# Patient Record
Sex: MALE | Race: WHITE | NOT HISPANIC OR LATINO | Employment: FULL TIME | URBAN - METROPOLITAN AREA
[De-identification: names, ages, dates, MRNs, and addresses within clinical notes are randomized per-mention and may not be internally consistent; named-entity substitution may affect disease eponyms.]

---

## 2018-01-23 ENCOUNTER — APPOINTMENT (OUTPATIENT)
Dept: GENERAL RADIOLOGY | Facility: HOSPITAL | Age: 43
End: 2018-01-23

## 2018-01-23 ENCOUNTER — HOSPITAL ENCOUNTER (EMERGENCY)
Facility: HOSPITAL | Age: 43
Discharge: HOME | End: 2018-01-24
Attending: EMERGENCY MEDICINE | Admitting: EMERGENCY MEDICINE

## 2018-01-23 DIAGNOSIS — E86.0 DEHYDRATION: ICD-10-CM

## 2018-01-23 DIAGNOSIS — B34.9 VIRAL SYNDROME: Primary | ICD-10-CM

## 2018-01-23 LAB
ALBUMIN SERPL-MCNC: 4.6 G/DL (ref 3.2–4.8)
ALBUMIN/GLOB SERPL: 1.8 G/DL (ref 1.5–2.5)
ALP SERPL-CCNC: 69 U/L (ref 25–100)
ALT SERPL W P-5'-P-CCNC: 91 U/L (ref 7–40)
ANION GAP SERPL CALCULATED.3IONS-SCNC: 7 MMOL/L (ref 3–11)
AST SERPL-CCNC: 54 U/L (ref 0–33)
BASOPHILS # BLD AUTO: 0.02 10*3/MM3 (ref 0–0.2)
BASOPHILS NFR BLD AUTO: 0.2 % (ref 0–1)
BILIRUB SERPL-MCNC: 0.4 MG/DL (ref 0.3–1.2)
BNP SERPL-MCNC: <2 PG/ML (ref 0–100)
BUN BLD-MCNC: 14 MG/DL (ref 9–23)
BUN/CREAT SERPL: 14 (ref 7–25)
CALCIUM SPEC-SCNC: 9.6 MG/DL (ref 8.7–10.4)
CHLORIDE SERPL-SCNC: 107 MMOL/L (ref 99–109)
CO2 SERPL-SCNC: 25 MMOL/L (ref 20–31)
CREAT BLD-MCNC: 1 MG/DL (ref 0.6–1.3)
DEPRECATED RDW RBC AUTO: 42.5 FL (ref 37–54)
EOSINOPHIL # BLD AUTO: 0.1 10*3/MM3 (ref 0–0.3)
EOSINOPHIL NFR BLD AUTO: 1.2 % (ref 0–3)
ERYTHROCYTE [DISTWIDTH] IN BLOOD BY AUTOMATED COUNT: 12.8 % (ref 11.3–14.5)
FLUAV AG NPH QL: NEGATIVE
FLUBV AG NPH QL IA: NEGATIVE
GFR SERPL CREATININE-BSD FRML MDRD: 82 ML/MIN/1.73
GLOBULIN UR ELPH-MCNC: 2.6 GM/DL
GLUCOSE BLD-MCNC: 101 MG/DL (ref 70–100)
HCT VFR BLD AUTO: 48.5 % (ref 38.9–50.9)
HGB BLD-MCNC: 16.8 G/DL (ref 13.1–17.5)
HOLD SPECIMEN: NORMAL
HOLD SPECIMEN: NORMAL
IMM GRANULOCYTES # BLD: 0.04 10*3/MM3 (ref 0–0.03)
IMM GRANULOCYTES NFR BLD: 0.5 % (ref 0–0.6)
LYMPHOCYTES # BLD AUTO: 0.68 10*3/MM3 (ref 0.6–4.8)
LYMPHOCYTES NFR BLD AUTO: 8.4 % (ref 24–44)
MCH RBC QN AUTO: 31.5 PG (ref 27–31)
MCHC RBC AUTO-ENTMCNC: 34.6 G/DL (ref 32–36)
MCV RBC AUTO: 90.8 FL (ref 80–99)
MONOCYTES # BLD AUTO: 1.35 10*3/MM3 (ref 0–1)
MONOCYTES NFR BLD AUTO: 16.8 % (ref 0–12)
NEUTROPHILS # BLD AUTO: 5.86 10*3/MM3 (ref 1.5–8.3)
NEUTROPHILS NFR BLD AUTO: 72.9 % (ref 41–71)
PLATELET # BLD AUTO: 184 10*3/MM3 (ref 150–450)
PMV BLD AUTO: 9.7 FL (ref 6–12)
POTASSIUM BLD-SCNC: 4 MMOL/L (ref 3.5–5.5)
PROT SERPL-MCNC: 7.2 G/DL (ref 5.7–8.2)
RBC # BLD AUTO: 5.34 10*6/MM3 (ref 4.2–5.76)
SODIUM BLD-SCNC: 139 MMOL/L (ref 132–146)
TROPONIN I SERPL-MCNC: 0.01 NG/ML (ref 0–0.07)
WBC NRBC COR # BLD: 8.05 10*3/MM3 (ref 3.5–10.8)
WHOLE BLOOD HOLD SPECIMEN: NORMAL
WHOLE BLOOD HOLD SPECIMEN: NORMAL

## 2018-01-23 PROCEDURE — 84484 ASSAY OF TROPONIN QUANT: CPT

## 2018-01-23 PROCEDURE — 87804 INFLUENZA ASSAY W/OPTIC: CPT | Performed by: EMERGENCY MEDICINE

## 2018-01-23 PROCEDURE — 96360 HYDRATION IV INFUSION INIT: CPT

## 2018-01-23 PROCEDURE — 83880 ASSAY OF NATRIURETIC PEPTIDE: CPT | Performed by: EMERGENCY MEDICINE

## 2018-01-23 PROCEDURE — 80053 COMPREHEN METABOLIC PANEL: CPT | Performed by: EMERGENCY MEDICINE

## 2018-01-23 PROCEDURE — 93005 ELECTROCARDIOGRAM TRACING: CPT | Performed by: EMERGENCY MEDICINE

## 2018-01-23 PROCEDURE — 71045 X-RAY EXAM CHEST 1 VIEW: CPT

## 2018-01-23 PROCEDURE — 99284 EMERGENCY DEPT VISIT MOD MDM: CPT

## 2018-01-23 PROCEDURE — 85025 COMPLETE CBC W/AUTO DIFF WBC: CPT | Performed by: EMERGENCY MEDICINE

## 2018-01-23 RX ORDER — SODIUM CHLORIDE 0.9 % (FLUSH) 0.9 %
10 SYRINGE (ML) INJECTION AS NEEDED
Status: DISCONTINUED | OUTPATIENT
Start: 2018-01-23 | End: 2018-01-24 | Stop reason: HOSPADM

## 2018-01-23 RX ORDER — IBUPROFEN 600 MG/1
600 TABLET ORAL ONCE
Status: COMPLETED | OUTPATIENT
Start: 2018-01-23 | End: 2018-01-23

## 2018-01-23 RX ADMIN — SODIUM CHLORIDE 1000 ML: 9 INJECTION, SOLUTION INTRAVENOUS at 21:43

## 2018-01-23 RX ADMIN — IBUPROFEN 600 MG: 600 TABLET, FILM COATED ORAL at 21:43

## 2018-01-24 VITALS
WEIGHT: 255 LBS | DIASTOLIC BLOOD PRESSURE: 103 MMHG | OXYGEN SATURATION: 77 % | BODY MASS INDEX: 33.8 KG/M2 | HEART RATE: 128 BPM | RESPIRATION RATE: 18 BRPM | SYSTOLIC BLOOD PRESSURE: 161 MMHG | TEMPERATURE: 98.9 F | HEIGHT: 73 IN

## 2018-01-24 NOTE — ED PROVIDER NOTES
Subjective   HPI Comments: Mr. Petr Samayoa is a 42 year old male who presents to the ED with c/o flu-like sx. The patient reports that he woke up this morning feeling poorly, with associated shortness of air, cough, fatigue, fever, generalized myalgias, rhinorrhea, and ear pain. The patient states that his sx have been constant since onset, and have worsened considerably over the course of the day. The patient tried resting, but denies any relief. The patient was evaluated at an [facility] PTA, but was directed to the ED for his fast heart rate and negative flu swab. The patient denies any associated diarrhea, nausea, sore throat, vomiting, or congestion. The patient denies a hx of diabetes mellitus, immunocompromise state, or kidney disease. The patient denies any other acute sx at this time.    Patient is a 42 y.o. male presenting with flu symptoms.   History provided by:  Patient  Flu Symptoms   Presenting symptoms: cough, fatigue, fever, myalgias, rhinorrhea and shortness of breath    Presenting symptoms: no diarrhea, no nausea, no sore throat and no vomiting    Severity:  Moderate  Onset quality:  Sudden  Duration:  1 day  Progression:  Worsening  Chronicity:  New  Relieved by:  Nothing  Worsened by:  Nothing  Ineffective treatments:  Rest  Associated symptoms: ear pain    Associated symptoms: no congestion    Risk factors: not elderly, no diabetes problem, no immunocompromised state, no kidney disease and not pregnant        Review of Systems   Constitutional: Positive for fatigue and fever.   HENT: Positive for ear pain and rhinorrhea. Negative for congestion and sore throat.    Respiratory: Positive for cough and shortness of breath.    Gastrointestinal: Negative for diarrhea, nausea and vomiting.   Genitourinary: Negative for decreased urine volume, difficulty urinating, dysuria, frequency, hematuria and urgency.   Musculoskeletal: Positive for myalgias.   Allergic/Immunologic: Negative for immunocompromised  state.   All other systems reviewed and are negative.      Past Medical History:   Diagnosis Date   • Hypertension        No Known Allergies    History reviewed. No pertinent surgical history.    Family History   Problem Relation Age of Onset   • Diabetes Mother    • Coronary artery disease Mother    • Coronary artery disease Father        Social History     Social History   • Marital status:      Spouse name: N/A   • Number of children: N/A   • Years of education: N/A     Occupational History   • engineering      Social History Main Topics   • Smoking status: Never Smoker   • Smokeless tobacco: Never Used   • Alcohol use Yes      Comment: occasionally   • Drug use: No   • Sexual activity: Not Asked     Other Topics Concern   • None     Social History Narrative         Objective   Physical Exam   Constitutional: He is oriented to person, place, and time. He appears well-developed and well-nourished. No distress.   The patient is mildly diaphoretic.   HENT:   Head: Normocephalic and atraumatic.   Right Ear: Tympanic membrane normal.   Left Ear: Tympanic membrane normal.   Mouth/Throat: Oropharynx is clear and moist.   Ears are normal. Throat benign. Airway patent.   Eyes: Conjunctivae are normal. No scleral icterus.   Neck: Normal range of motion. Neck supple.   Ranges neck well, no signs of meningismus.   Cardiovascular: Regular rhythm and normal heart sounds.  Tachycardia present.  Exam reveals no gallop and no friction rub.    No murmur heard.  Pulmonary/Chest: Effort normal and breath sounds normal. No respiratory distress. He has no wheezes. He has no rales.   Abdominal: Soft. Bowel sounds are normal. There is no tenderness. There is no guarding.   Musculoskeletal: Normal range of motion.   Lymphadenopathy:     He has no cervical adenopathy.   No anterior cervical lymphadenopathy.   Neurological: He is alert and oriented to person, place, and time.   Skin: Skin is warm. He is diaphoretic.   Psychiatric:  He has a normal mood and affect. His behavior is normal.   Nursing note and vitals reviewed.      Procedures         ED Course  ED Course       Recent Results (from the past 24 hour(s))   POCT Influenza A/B    Collection Time: 01/23/18  8:41 PM   Result Value Ref Range    Rapid Influenza A Ag negative     Rapid Influenza B Ag negative     Internal Control Passed Passed    Lot Number [id number]     Expiration Date 12-6-2020    Comprehensive Metabolic Panel    Collection Time: 01/23/18  9:33 PM   Result Value Ref Range    Glucose 101 (H) 70 - 100 mg/dL    BUN 14 9 - 23 mg/dL    Creatinine 1.00 0.60 - 1.30 mg/dL    Sodium 139 132 - 146 mmol/L    Potassium 4.0 3.5 - 5.5 mmol/L    Chloride 107 99 - 109 mmol/L    CO2 25.0 20.0 - 31.0 mmol/L    Calcium 9.6 8.7 - 10.4 mg/dL    Total Protein 7.2 5.7 - 8.2 g/dL    Albumin 4.60 3.20 - 4.80 g/dL    ALT (SGPT) 91 (H) 7 - 40 U/L    AST (SGOT) 54 (H) 0 - 33 U/L    Alkaline Phosphatase 69 25 - 100 U/L    Total Bilirubin 0.4 0.3 - 1.2 mg/dL    eGFR Non African Amer 82 >60 mL/min/1.73    Globulin 2.6 gm/dL    A/G Ratio 1.8 1.5 - 2.5 g/dL    BUN/Creatinine Ratio 14.0 7.0 - 25.0    Anion Gap 7.0 3.0 - 11.0 mmol/L   BNP    Collection Time: 01/23/18  9:33 PM   Result Value Ref Range    BNP <2.0 0.0 - 100.0 pg/mL   CBC Auto Differential    Collection Time: 01/23/18  9:33 PM   Result Value Ref Range    WBC 8.05 3.50 - 10.80 10*3/mm3    RBC 5.34 4.20 - 5.76 10*6/mm3    Hemoglobin 16.8 13.1 - 17.5 g/dL    Hematocrit 48.5 38.9 - 50.9 %    MCV 90.8 80.0 - 99.0 fL    MCH 31.5 (H) 27.0 - 31.0 pg    MCHC 34.6 32.0 - 36.0 g/dL    RDW 12.8 11.3 - 14.5 %    RDW-SD 42.5 37.0 - 54.0 fl    MPV 9.7 6.0 - 12.0 fL    Platelets 184 150 - 450 10*3/mm3    Neutrophil % 72.9 (H) 41.0 - 71.0 %    Lymphocyte % 8.4 (L) 24.0 - 44.0 %    Monocyte % 16.8 (H) 0.0 - 12.0 %    Eosinophil % 1.2 0.0 - 3.0 %    Basophil % 0.2 0.0 - 1.0 %    Immature Grans % 0.5 0.0 - 0.6 %    Neutrophils, Absolute 5.86 1.50 - 8.30 10*3/mm3  "   Lymphocytes, Absolute 0.68 0.60 - 4.80 10*3/mm3    Monocytes, Absolute 1.35 (H) 0.00 - 1.00 10*3/mm3    Eosinophils, Absolute 0.10 0.00 - 0.30 10*3/mm3    Basophils, Absolute 0.02 0.00 - 0.20 10*3/mm3    Immature Grans, Absolute 0.04 (H) 0.00 - 0.03 10*3/mm3   POC Troponin, Rapid    Collection Time: 01/23/18  9:38 PM   Result Value Ref Range    Troponin I 0.01 0.00 - 0.07 ng/mL   Influenza Antigen, Rapid - Swab, Nasopharynx    Collection Time: 01/23/18  9:44 PM   Result Value Ref Range    Influenza A Ag, EIA Negative Negative    Influenza B Ag, EIA Negative Negative     Note: In addition to lab results from this visit, the labs listed above may include labs taken at another facility or during a different encounter within the last 24 hours. Please correlate lab times with ED admission and discharge times for further clarification of the services performed during this visit.    XR Chest 1 View   Final Result     No acute findings.      THIS DOCUMENT HAS BEEN ELECTRONICALLY SIGNED BY TATE VELARDE MD        Vitals:    01/23/18 2102   BP: 167/100   Pulse: (!) 128   Resp: 18   Temp: 100 °F (37.8 °C)   TempSrc: Oral   SpO2: 97%   Weight: 116 kg (255 lb)   Height: 185.4 cm (73\")     Medications   sodium chloride 0.9 % flush 10 mL (not administered)   sodium chloride 0.9 % bolus 1,000 mL (1,000 mL Intravenous New Bag 1/23/18 2143)   ibuprofen (ADVIL,MOTRIN) tablet 600 mg (600 mg Oral Given 1/23/18 2143)     ECG/EMG Results (last 24 hours)     Procedure Component Value Units Date/Time    ECG 12 Lead [[id number]] Collected:  01/23/18 2108     Updated:  01/23/18 2111    Narrative:       Test Reason : SOA Protocol  Blood Pressure : **/** mmHG  Vent. Rate : 133 BPM     Atrial Rate : 133 BPM     P-R Int : 150 ms          QRS Dur : 092 ms      QT Int : 290 ms       P-R-T Axes : 032 061 013 degrees     QTc Int : 431 ms    Sinus tachycardia  Septal infarct , age undetermined  Abnormal ECG  No previous ECGs available  Confirmed by " ROSA ELENA CANO ([id number]) on 1/23/2018 9:11:22 PM    Referred By:  EDMD           Confirmed By:ROSA ELENA CANO                      MDM  Number of Diagnoses or Management Options  Dehydration: new and requires workup  Viral syndrome: new and requires workup  Diagnosis management comments: Influenza screen negative, chest x-ray does not show any acute infection, no significant laboratory abnormalities.    Patient was given 1 L IV fluids, and the previously administered antipyretics have helped control the patient's fever and heart rate.    Patient will be discharged with the advise to alternate Tylenol and ibuprofen for fever control, continue to drink plenty of fluids and get rest.    Follow-up with primary care physician for repeat evaluation in 3 days.       Amount and/or Complexity of Data Reviewed  Clinical lab tests: ordered and reviewed  Tests in the radiology section of CPT®: reviewed and ordered  Review and summarize past medical records: yes  Independent visualization of images, tracings, or specimens: yes    Patient Progress  Patient progress: stable      Final diagnoses:   Viral syndrome   Dehydration       Documentation assistance provided by memo Rasmussen.  Information recorded by the scribe was done at my direction and has been verified and validated by me.     Yair Rasmussen  01/23/18 4327       Rosa Elena Cano MD  01/23/18 9362

## 2018-01-24 NOTE — DISCHARGE INSTRUCTIONS
Patient will be advised to alternate ibuprofen 600 mg, and Tylenol 650 mg, every 3 hours to help control fever.    Patient will be advised to drink plenty of fluids and get plenty of rest    Follow-up with primary care physician for repeat evaluation in 3 days.

## 2021-08-17 PROCEDURE — U0004 COV-19 TEST NON-CDC HGH THRU: HCPCS | Performed by: NURSE PRACTITIONER

## 2021-08-17 PROCEDURE — 87081 CULTURE SCREEN ONLY: CPT | Performed by: NURSE PRACTITIONER

## 2021-08-19 ENCOUNTER — TELEPHONE (OUTPATIENT)
Dept: URGENT CARE | Facility: CLINIC | Age: 46
End: 2021-08-19

## 2021-08-20 ENCOUNTER — TELEPHONE (OUTPATIENT)
Dept: URGENT CARE | Facility: CLINIC | Age: 46
End: 2021-08-20

## 2021-08-20 NOTE — TELEPHONE ENCOUNTER
----- Message from Jeremías Cramer MD sent at 8/19/2021 10:45 AM EDT -----  Strep culture is negative

## 2022-04-11 DIAGNOSIS — Z12.11 SCREENING FOR COLON CANCER: Primary | ICD-10-CM

## 2022-05-03 ENCOUNTER — OUTSIDE FACILITY SERVICE (OUTPATIENT)
Dept: GASTROENTEROLOGY | Facility: CLINIC | Age: 47
End: 2022-05-03

## 2022-05-03 PROCEDURE — 45385 COLONOSCOPY W/LESION REMOVAL: CPT | Performed by: INTERNAL MEDICINE

## 2022-05-03 PROCEDURE — 88305 TISSUE EXAM BY PATHOLOGIST: CPT | Performed by: INTERNAL MEDICINE

## 2022-05-04 ENCOUNTER — LAB REQUISITION (OUTPATIENT)
Dept: LAB | Facility: HOSPITAL | Age: 47
End: 2022-05-04

## 2022-05-04 DIAGNOSIS — Z12.11 ENCOUNTER FOR SCREENING FOR MALIGNANT NEOPLASM OF COLON: ICD-10-CM

## 2022-05-05 ENCOUNTER — TELEPHONE (OUTPATIENT)
Dept: GASTROENTEROLOGY | Facility: CLINIC | Age: 47
End: 2022-05-05

## 2022-05-05 LAB
CYTO UR: NORMAL
LAB AP CASE REPORT: NORMAL
LAB AP CLINICAL INFORMATION: NORMAL
PATH REPORT.FINAL DX SPEC: NORMAL
PATH REPORT.GROSS SPEC: NORMAL

## 2022-05-05 NOTE — TELEPHONE ENCOUNTER
----- Message from Danish Glasgow MD sent at 5/5/2022  9:42 AM EDT -----  Please let Petr know he had multiple adenomas.  Follow-up in 3 years time is recommended

## 2024-01-01 ENCOUNTER — APPOINTMENT (OUTPATIENT)
Dept: GENERAL RADIOLOGY | Facility: HOSPITAL | Age: 49
End: 2024-01-01
Payer: COMMERCIAL

## 2024-01-01 ENCOUNTER — HOSPITAL ENCOUNTER (EMERGENCY)
Facility: HOSPITAL | Age: 49
Discharge: HOME | End: 2024-01-01
Attending: EMERGENCY MEDICINE | Admitting: EMERGENCY MEDICINE
Payer: COMMERCIAL

## 2024-01-01 VITALS
HEIGHT: 73 IN | SYSTOLIC BLOOD PRESSURE: 168 MMHG | RESPIRATION RATE: 18 BRPM | BODY MASS INDEX: 31.14 KG/M2 | TEMPERATURE: 97.9 F | OXYGEN SATURATION: 98 % | WEIGHT: 235 LBS | HEART RATE: 99 BPM | DIASTOLIC BLOOD PRESSURE: 94 MMHG

## 2024-01-01 DIAGNOSIS — M54.9 UPPER BACK PAIN: Primary | ICD-10-CM

## 2024-01-01 PROCEDURE — 99282 EMERGENCY DEPT VISIT SF MDM: CPT

## 2024-01-01 PROCEDURE — 25010000002 KETOROLAC TROMETHAMINE PER 15 MG: Performed by: PHYSICIAN ASSISTANT

## 2024-01-01 PROCEDURE — 72040 X-RAY EXAM NECK SPINE 2-3 VW: CPT

## 2024-01-01 PROCEDURE — 72070 X-RAY EXAM THORAC SPINE 2VWS: CPT

## 2024-01-01 PROCEDURE — 71045 X-RAY EXAM CHEST 1 VIEW: CPT

## 2024-01-01 PROCEDURE — 96372 THER/PROPH/DIAG INJ SC/IM: CPT

## 2024-01-01 PROCEDURE — 25010000002 ORPHENADRINE CITRATE PER 60 MG: Performed by: PHYSICIAN ASSISTANT

## 2024-01-01 RX ORDER — CYCLOBENZAPRINE HCL 10 MG
10 TABLET ORAL 3 TIMES DAILY PRN
Qty: 21 TABLET | Refills: 0 | Status: SHIPPED | OUTPATIENT
Start: 2024-01-01

## 2024-01-01 RX ORDER — KETOROLAC TROMETHAMINE 30 MG/ML
60 INJECTION, SOLUTION INTRAMUSCULAR; INTRAVENOUS ONCE
Status: COMPLETED | OUTPATIENT
Start: 2024-01-01 | End: 2024-01-01

## 2024-01-01 RX ORDER — ORPHENADRINE CITRATE 30 MG/ML
60 INJECTION INTRAMUSCULAR; INTRAVENOUS ONCE
Status: COMPLETED | OUTPATIENT
Start: 2024-01-01 | End: 2024-01-01

## 2024-01-01 RX ADMIN — KETOROLAC TROMETHAMINE 60 MG: 30 INJECTION, SOLUTION INTRAMUSCULAR; INTRAVENOUS at 10:47

## 2024-01-01 RX ADMIN — ORPHENADRINE CITRATE 60 MG: 60 INJECTION INTRAMUSCULAR; INTRAVENOUS at 10:48

## 2024-01-01 NOTE — ED PROVIDER NOTES
Subjective  History of Present Illness:    Chief Complaint:   Chief Complaint   Patient presents with    Back Pain    Shoulder Pain      History of Present Illness: Petr Samayoa is a 48 y.o. male who presents to the emergency department complaining of upper back pain after coughing.  Patient states he had chest congestion and a cough recently coughed very hard while leaning back in a recliner and had acute onset of pain in his upper thoracic back and scapular area.  Recently seen PCP and received a steroid shot steroid oral taper and antibiotics.  On the last day of the steroid taper.  States he continues to have pain in his upper back and right scapular area reproducible 100% with movement and palpation.  He also has some numbness in his right elbow and the tips of his right second and third fingers.  No chest pain.  No shortness of breath.  Cough is improved.  Has a history of hypertension and has not taken his antihypertensives in several days.  Onset: 8 days ago  Duration: Ongoing  Exacerbating / Alleviating factors: Worse with movement and palpation  Associated symptoms: None      Nurses Notes reviewed and agree, including vitals, allergies, social history and prior medical history.     Review of Systems   Constitutional: Negative.    HENT: Negative.     Eyes: Negative.    Respiratory: Negative.     Cardiovascular: Negative.    Gastrointestinal: Negative.    Genitourinary: Negative.    Musculoskeletal:  Positive for back pain.   Skin: Negative.    Allergic/Immunologic: Negative.    Neurological:         Numbness right elbow and right fingers   Psychiatric/Behavioral: Negative.     All other systems reviewed and are negative.      Past Medical History:   Diagnosis Date    Hypertension        Allergies:    Patient has no known allergies.      History reviewed. No pertinent surgical history.      Social History     Socioeconomic History    Marital status:    Tobacco Use    Smoking status: Never    Smokeless  "tobacco: Never   Vaping Use    Vaping Use: Never used   Substance and Sexual Activity    Alcohol use: Yes     Comment: occasionally    Drug use: No    Sexual activity: Defer         Family History   Problem Relation Age of Onset    Diabetes Mother     Coronary artery disease Mother     Coronary artery disease Father        Objective  Physical Exam:  BP (!) 197/119 (BP Location: Left arm, Patient Position: Sitting)   Pulse 110   Temp 98.2 °F (36.8 °C) (Oral)   Resp 16   Ht 185.4 cm (73\")   Wt 107 kg (235 lb)   SpO2 96%   BMI 31.00 kg/m²      Physical Exam  Vitals and nursing note reviewed.   Constitutional:       General: He is not in acute distress.     Appearance: Normal appearance. He is normal weight. He is not ill-appearing, toxic-appearing or diaphoretic.   HENT:      Head: Normocephalic and atraumatic.      Nose: Nose normal.   Eyes:      Extraocular Movements: Extraocular movements intact.   Cardiovascular:      Rate and Rhythm: Normal rate and regular rhythm.      Pulses: Normal pulses.      Heart sounds: Normal heart sounds.   Pulmonary:      Effort: Pulmonary effort is normal. No respiratory distress.      Breath sounds: Normal breath sounds. No stridor. No wheezing, rhonchi or rales.   Abdominal:      General: Abdomen is flat.   Musculoskeletal:         General: Normal range of motion.      Cervical back: Normal range of motion.        Back:       Comments: Tenderness to palpation in the right upper thoracic spine and scapular area.  No rash.   Skin:     General: Skin is warm and dry.   Neurological:      General: No focal deficit present.      Mental Status: He is alert. Mental status is at baseline.      Comments: Patient endorses numbness to the right olecranon area in the tips of the second and third digits although strength is normal, RUE is neurovascular intact otherwise.   Psychiatric:         Mood and Affect: Mood normal.         Behavior: Behavior normal.           Procedures    ED " Course:         Lab Results (last 24 hours)       ** No results found for the last 24 hours. **             XR Spine Thoracic 2 View    Result Date: 1/1/2024  PROCEDURE: XR SPINE THORACIC 2 VW-  HISTORY: cough, pain  FINDINGS: Two views of the thoracic spine were obtained. There is no evidence of fracture or dislocation. Vertebral alignment is normal. Mild degenerative changes are seen with small osteophytes present. No paraspinous soft tissue abnormality is identified.      Impression: No acute bony abnormality.  Mild degenerative change.    This report was signed and finalized on 1/1/2024 10:33 AM by Riley Traore MD.      XR Spine Cervical 3 View    Result Date: 1/1/2024  PROCEDURE: XR SPINE CERVICAL 3 VW-  HISTORY: cough, pain  FINDINGS: Three views of the cervical spine were obtained. There is no evidence of fracture or dislocation. The vertebral alignment is normal. The disc spaces are preserved. There is no focal soft tissue abnormality.        Impression: No acute bony abnormality.     This report was signed and finalized on 1/1/2024 10:32 AM by Riley Traore MD.      XR Chest 1 View    Result Date: 1/1/2024  PROCEDURE: XR CHEST 1 VW-  HISTORY: cough, right scapular pain   COMPARISON: None.  FINDINGS:  The heart size is normal. The mediastinum is normal. No acute pulmonary abnormality is identified. There is no pneumothorax. The bony thorax in intact.      Impression: No acute cardiopulmonary process.    This report was signed and finalized on 1/1/2024 10:31 AM by Riley Traore MD.          Medical Decision Making  Amount and/or Complexity of Data Reviewed  Radiology: ordered.    Risk  Prescription drug management.        Petr Samayoa is a 48 y.o. male who presents to the emergency department for evaluation of upper back pain    Differential diagnosis includes musculoskeletal pain, strain, sprain, pneumonia, rib fracture, compression fracture among other etiologies.    Plain films of the cervical and  thoracic spine and a chest x-ray ordered for further evaluation of the patient's presentation.    Chart review if available included outside testing, previous visits, prior labs, prior imaging, available notes from prior evaluations or visits with specialists, medication list, allergies, past medical history, past surgical history when applicable.    Patient was treated with Toradol and Norflex    Patient hypertensive but has been noncompliant with his antihypertensives for several days, no chest pain.  Pain is 100% reproducible with movement and palpation.  Suspect musculoskeletal etiology.  Has been using ice, recommended heat NSAIDs and muscle relaxers.  Will refer outpatient for further workup and treatment if symptoms do not improve.    Plan for disposition is discharged home.  Patient/family comfortable with and understanding of the plan.      Final diagnoses:   Upper back pain          Jason Weinberg PA-C  01/01/24 1104

## 2024-11-14 ENCOUNTER — OFFICE VISIT (OUTPATIENT)
Dept: INTERNAL MEDICINE | Facility: CLINIC | Age: 49
End: 2024-11-14
Payer: COMMERCIAL

## 2024-11-14 VITALS
WEIGHT: 236 LBS | DIASTOLIC BLOOD PRESSURE: 100 MMHG | OXYGEN SATURATION: 97 % | HEIGHT: 73 IN | SYSTOLIC BLOOD PRESSURE: 168 MMHG | TEMPERATURE: 98.1 F | BODY MASS INDEX: 31.28 KG/M2 | HEART RATE: 110 BPM

## 2024-11-14 DIAGNOSIS — Z76.89 ENCOUNTER TO ESTABLISH CARE: Primary | ICD-10-CM

## 2024-11-14 DIAGNOSIS — I10 PRIMARY HYPERTENSION: ICD-10-CM

## 2024-11-14 PROCEDURE — 99214 OFFICE O/P EST MOD 30 MIN: CPT | Performed by: NURSE PRACTITIONER

## 2024-11-14 RX ORDER — OLMESARTAN MEDOXOMIL AND HYDROCHLOROTHIAZIDE 20/12.5 20; 12.5 MG/1; MG/1
1 TABLET ORAL DAILY
Qty: 30 TABLET | Refills: 2 | Status: SHIPPED | OUTPATIENT
Start: 2024-11-14

## 2024-11-14 RX ORDER — METOPROLOL SUCCINATE 25 MG/1
25 TABLET, EXTENDED RELEASE ORAL DAILY
Qty: 30 TABLET | Refills: 2 | Status: SHIPPED | OUTPATIENT
Start: 2024-11-14

## 2024-11-14 NOTE — PROGRESS NOTES
Office Visit      Date: 2024   Patient Name: Petr Samayoa  : 1975   MRN: [id number]     Chief Complaint:    Chief Complaint   Patient presents with    [facility] Care     With Hillary osorio.     Hypertension     BP running high       History of Present Illness: Petr Samayoa is a 48 y.o. male presenting to [facility] care, HTN.  Lisinopril 40 mg is not helping his blood pressure. Has BP cuff at home. BP is running 150-160/90-100s.  No chest pain, soa, visual changes, headaches, edema   He drinks diet coke, no other caffeine. Working on weight loss/implementing exercise. Some smoking, not daily. Occasional alcohol use.     Subjective      Review of Systems:   Pertinent ROS noted in HPI.     I have reviewed the patients family history, social history, past medical history, past surgical history and have updated it as appropriate.     Medications:     Current Outpatient Medications:     metoprolol succinate XL (Toprol XL) 25 MG 24 hr tablet, Take 1 tablet by mouth Daily., Disp: 30 tablet, Rfl: 2    olmesartan-hydrochlorothiazide (BENICAR HCT) 20-12.5 MG per tablet, Take 1 tablet by mouth Daily., Disp: 30 tablet, Rfl: 2    Allergies:   No Known Allergies    Objective     Physical Exam  Vitals and nursing note reviewed.   Constitutional:       General: He is not in acute distress.     Appearance: Normal appearance. He is obese.   HENT:      Head: Normocephalic and atraumatic.   Eyes:      Extraocular Movements: Extraocular movements intact.      Pupils: Pupils are equal, round, and reactive to light.   Neck:      Vascular: No JVD.   Cardiovascular:      Rate and Rhythm: Normal rate and regular rhythm.   Pulmonary:      Effort: Pulmonary effort is normal.      Breath sounds: Normal breath sounds.   Musculoskeletal:         General: Normal range of motion.      Cervical back: Normal range of motion and neck supple.      Right lower leg: No edema.      Left lower leg: No edema.   Skin:     General: Skin is  "warm and dry.   Neurological:      General: No focal deficit present.      Mental Status: He is alert and oriented to person, place, and time.   Psychiatric:         Mood and Affect: Mood normal.         Behavior: Behavior normal.         Thought Content: Thought content normal.         Vital Signs:   Vitals:    11/14/24 0927   BP: 168/100   Pulse: 110   Temp: 98.1 °F (36.7 °C)   SpO2: 97%   Weight: 107 kg (236 lb)   Height: 185.4 cm (73\")     Body mass index is 31.14 kg/m².  BMI is >= 30 and <35. (Class 1 Obesity). The following options were offered after discussion;: exercise counseling/recommendations and nutrition counseling/recommendations      Assessment / Plan      Assessment/Plan:   Diagnoses and all orders for this visit:    1. Encounter to establish care (Primary)    2. Primary hypertension  -     olmesartan-hydrochlorothiazide (BENICAR HCT) 20-12.5 MG per tablet; Take 1 tablet by mouth Daily.  Dispense: 30 tablet; Refill: 2  -     metoprolol succinate XL (Toprol XL) 25 MG 24 hr tablet; Take 1 tablet by mouth Daily.  Dispense: 30 tablet; Refill: 2  -     CBC (No Diff)  -     Comprehensive Metabolic Panel  -     Lipid Panel  -     TSH Rfx On Abnormal To Free T4       HTN uncontrolled  Stop lisinopril  Start olmesartan-hctz 20-12.5 mg daily in addition to metoprolol 25 mg XL  Monitor BP BID, keep log. Goal BP <130/80  If BP >180/110 this is hypertensive emergency go to ER for evaluation   Stop drinking diet coke, drink more water >80 fluid oz daily   Exercise 150 minutes per week  No nicotine or alcohol use  Low sodium diet <1500 mg daily  Obtain labs fasting prior to next appointment     Follow Up:   Return in about 4 weeks (around 12/12/2024) for Annual Physical, HTN follow up       Hillary XIONG  [facility] - [place]  "

## 2024-12-16 ENCOUNTER — PATIENT OUTREACH (OUTPATIENT)
Dept: CASE MANAGEMENT | Facility: OTHER | Age: 49
End: 2024-12-16
Payer: COMMERCIAL

## 2024-12-16 DIAGNOSIS — I10 HYPERTENSION, UNSPECIFIED TYPE: Primary | ICD-10-CM

## 2024-12-16 NOTE — OUTREACH NOTE
AMBULATORY CASE MANAGEMENT NOTE    Names and Relationships of Patient/Support Persons: Contact: Petr Samayoa; Relationship: Self -     [facility] Hypertension Care Companion Enrollment    Date/Time of successful contact: 12/16/2024  9:03 AM  Patient response: not interested  Comments: Pt has an arm cuff. Reviewed BP readings and stages of hypertension, medication compliance, recommended daily sodium intake and hypertensive crisis.  Enrolling provider: Hillary Obrien, [place]       Provided contact information for  and [facility] 24/7 Nurse Line via [place] and reviewed when to contact CM, office or seek EMS. Patient verbalized understanding. Next outreach scheduled.    Education Documentation  Managing Your Hypertension, taught by Isa Schultz, RN at 12/16/2024  9:16 AM.  Learner: Patient  Readiness: Acceptance  Method: Explanation, Teach Back  Response: Verbalizes Understanding    Hypertension, Adult, taught by Isa Schultz, RN at 12/16/2024  9:16 AM.  Learner: Patient  Readiness: Acceptance  Method: Explanation, Teach Back  Response: Verbalizes Understanding    Unresolved/Worsening Symptoms, taught by Isa Schultz, RN at 12/16/2024  9:16 AM.  Learner: Patient  Readiness: Acceptance  Method: Explanation, Teach Back  Response: Verbalizes Understanding    Self-Care, taught by Isa Schultz, RN at 12/16/2024  9:16 AM.  Learner: Patient  Readiness: Acceptance  Method: Explanation, Teach Back  Response: Verbalizes Understanding    Provider Follow-Up, taught by Isa Schultz RN at 12/16/2024  9:16 AM.  Learner: Patient  Readiness: Acceptance  Method: Explanation, Teach Back  Response: Verbalizes Understanding    Medication Management, taught by Isa Schultz RN at 12/16/2024  9:16 AM.  Learner: Patient  Readiness: Acceptance  Method: Explanation, Teach Back  Response: Verbalizes Understanding    Blood Pressure Monitoring, taught by Isa Schultz RN at 12/16/2024  9:16 AM.  Learner:  Patient  Readiness: Acceptance  Method: Explanation, Teach Back  Response: Verbalizes Understanding    Risk Factors, taught by Isa Schultz, RN at 12/16/2024  9:16 AM.  Learner: Patient  Readiness: Acceptance  Method: Explanation, Teach Back  Response: Verbalizes Understanding    Description, taught by Isa Schultz, RN at 12/16/2024  9:16 AM.  Learner: Patient  Readiness: Acceptance  Method: Explanation, Teach Back  Response: Verbalizes Understanding          Isa GALEANA  Ambulatory Case Management    12/16/2024, 09:16 EST

## 2025-01-03 ENCOUNTER — TELEPHONE (OUTPATIENT)
Dept: CASE MANAGEMENT | Facility: OTHER | Age: 50
End: 2025-01-03
Payer: COMMERCIAL

## 2025-01-03 DIAGNOSIS — I10 HYPERTENSION, UNSPECIFIED TYPE: Primary | ICD-10-CM

## 2025-01-03 NOTE — TELEPHONE ENCOUNTER
MIRANDA CM outreach with Patient; left a voicemail requesting a return call to ROSHAN Moss with Chronic Care Management at [phone] regarding the [facility] HTN Program.

## 2025-01-27 ENCOUNTER — TELEPHONE (OUTPATIENT)
Dept: CASE MANAGEMENT | Facility: OTHER | Age: 50
End: 2025-01-27
Payer: COMMERCIAL

## 2025-01-27 DIAGNOSIS — I10 HYPERTENSION, UNSPECIFIED TYPE: Primary | ICD-10-CM

## 2025-01-27 NOTE — TELEPHONE ENCOUNTER
MIRANDA CM outreach with Patient; left a voicemail requesting a return call to Isa RN with Chronic Care Management at [phone] regarding [facility].

## 2025-02-17 ENCOUNTER — PATIENT OUTREACH (OUTPATIENT)
Dept: CASE MANAGEMENT | Facility: OTHER | Age: 50
End: 2025-02-17
Payer: COMMERCIAL

## 2025-02-17 DIAGNOSIS — I10 HYPERTENSION, UNSPECIFIED TYPE: Primary | ICD-10-CM

## 2025-02-17 NOTE — OUTREACH NOTE
AMBULATORY CASE MANAGEMENT NOTE    Patient Outreach    AMB JOCELYN completed chart review and no urgent care or emergency department visits in the last 30 days. Patient does not have a scheduled follow up or refills on file. He has not completed any BP reading in the [facility] HTN program. Patient has not contacted MIRANDA BANKS for any assistance or concerns. HRCM: Closed; Patient goal achieved.    Isa GALEANA  Ambulatory Case Management    2/17/2025, 15:07 EST

## 2025-06-24 DIAGNOSIS — I10 PRIMARY HYPERTENSION: ICD-10-CM

## 2025-06-24 RX ORDER — OLMESARTAN MEDOXOMIL AND HYDROCHLOROTHIAZIDE 20/12.5 20; 12.5 MG/1; MG/1
1 TABLET ORAL DAILY
Qty: 30 TABLET | Refills: 0 | Status: SHIPPED | OUTPATIENT
Start: 2025-06-24

## 2025-06-24 RX ORDER — METOPROLOL SUCCINATE 25 MG/1
25 TABLET, EXTENDED RELEASE ORAL DAILY
Qty: 30 TABLET | Refills: 0 | Status: SHIPPED | OUTPATIENT
Start: 2025-06-24

## 2025-08-29 DIAGNOSIS — I10 PRIMARY HYPERTENSION: ICD-10-CM

## 2025-08-29 RX ORDER — OLMESARTAN MEDOXOMIL AND HYDROCHLOROTHIAZIDE 20/12.5 20; 12.5 MG/1; MG/1
1 TABLET ORAL DAILY
Qty: 30 TABLET | Refills: 0 | Status: SHIPPED | OUTPATIENT
Start: 2025-08-29

## 2025-08-29 RX ORDER — METOPROLOL SUCCINATE 25 MG/1
25 TABLET, EXTENDED RELEASE ORAL DAILY
Qty: 30 TABLET | Refills: 0 | Status: SHIPPED | OUTPATIENT
Start: 2025-08-29